# Patient Record
Sex: MALE | Race: ASIAN | NOT HISPANIC OR LATINO
[De-identification: names, ages, dates, MRNs, and addresses within clinical notes are randomized per-mention and may not be internally consistent; named-entity substitution may affect disease eponyms.]

---

## 2019-05-09 ENCOUNTER — APPOINTMENT (OUTPATIENT)
Dept: OPHTHALMOLOGY | Facility: CLINIC | Age: 69
End: 2019-05-09

## 2020-11-20 ENCOUNTER — APPOINTMENT (OUTPATIENT)
Dept: CARDIOLOGY | Facility: CLINIC | Age: 70
End: 2020-11-20
Payer: MEDICARE

## 2020-11-20 ENCOUNTER — NON-APPOINTMENT (OUTPATIENT)
Age: 70
End: 2020-11-20

## 2020-11-20 VITALS
DIASTOLIC BLOOD PRESSURE: 83 MMHG | TEMPERATURE: 98.4 F | WEIGHT: 160 LBS | OXYGEN SATURATION: 95 % | HEART RATE: 72 BPM | BODY MASS INDEX: 25.71 KG/M2 | HEIGHT: 66 IN | RESPIRATION RATE: 18 BRPM | SYSTOLIC BLOOD PRESSURE: 161 MMHG

## 2020-11-20 DIAGNOSIS — Z82.49 FAMILY HISTORY OF ISCHEMIC HEART DISEASE AND OTHER DISEASES OF THE CIRCULATORY SYSTEM: ICD-10-CM

## 2020-11-20 DIAGNOSIS — E78.5 HYPERLIPIDEMIA, UNSPECIFIED: ICD-10-CM

## 2020-11-20 DIAGNOSIS — R00.2 PALPITATIONS: ICD-10-CM

## 2020-11-20 PROCEDURE — 93000 ELECTROCARDIOGRAM COMPLETE: CPT

## 2020-11-20 PROCEDURE — 99204 OFFICE O/P NEW MOD 45 MIN: CPT

## 2020-11-25 PROBLEM — E78.5 HLD (HYPERLIPIDEMIA): Status: ACTIVE | Noted: 2020-11-25

## 2020-11-25 RX ORDER — ATORVASTATIN CALCIUM 20 MG/1
20 TABLET, FILM COATED ORAL
Refills: 0 | Status: ACTIVE | COMMUNITY

## 2020-11-25 NOTE — PHYSICAL EXAM
[General Appearance - Well Developed] : well developed [Normal Appearance] : normal appearance [Well Groomed] : well groomed [General Appearance - Well Nourished] : well nourished [No Deformities] : no deformities [General Appearance - In No Acute Distress] : no acute distress [Normal Conjunctiva] : the conjunctiva exhibited no abnormalities [Eyelids - No Xanthelasma] : the eyelids demonstrated no xanthelasmas [Normal Oral Mucosa] : normal oral mucosa [No Oral Pallor] : no oral pallor [No Oral Cyanosis] : no oral cyanosis [Normal Jugular Venous A Waves Present] : normal jugular venous A waves present [Normal Jugular Venous V Waves Present] : normal jugular venous V waves present [No Jugular Venous Parekh A Waves] : no jugular venous parekh A waves [Heart Rate And Rhythm] : heart rate and rhythm were normal [Heart Sounds] : normal S1 and S2 [Murmurs] : no murmurs present [Arterial Pulses Normal] : the arterial pulses were normal [Edema] : no peripheral edema present [Respiration, Rhythm And Depth] : normal respiratory rhythm and effort [Exaggerated Use Of Accessory Muscles For Inspiration] : no accessory muscle use [Auscultation Breath Sounds / Voice Sounds] : lungs were clear to auscultation bilaterally [Abdomen Soft] : soft [Abdomen Tenderness] : non-tender [Abdomen Mass (___ Cm)] : no abdominal mass palpated [Abnormal Walk] : normal gait [Gait - Sufficient For Exercise Testing] : the gait was sufficient for exercise testing [Nail Clubbing] : no clubbing of the fingernails [Cyanosis, Localized] : no localized cyanosis [Petechial Hemorrhages (___cm)] : no petechial hemorrhages [] : no ischemic changes [Oriented To Time, Place, And Person] : oriented to person, place, and time [Affect] : the affect was normal [Mood] : the mood was normal [No Anxiety] : not feeling anxious

## 2020-11-25 NOTE — REASON FOR VISIT
[FreeTextEntry1] : 70 year old male with HTN and colon CA s/p surgery presents for evaluation of uncontrolled BP.  Pt has never had cardiology evaluation.  His blood pressure at home usually ranges 160-170/. He had colonoscopy done 2 days ago.  He was told by GI doctor, Beny Bhatia, that his blood pressure was high. He was in [facility] for chest pain 2 years ago. The result was negative for CAD.  He was also in [facility] for unbalance gait.  His CT head was negative.  He was diagnosed with vertigo.  He reports palpitations when he is nervous.  Pt denies CP/SOB.  He denies hx of syncope.

## 2020-11-25 NOTE — DISCUSSION/SUMMARY
[FreeTextEntry1] : 70 year old male with HTN and colon CA s/p surgery presents for evaluation of uncontrolled BP.  Pt has never had cardiology evaluation.  His blood pressure at home usually ranges 160-170/. He had colonoscopy done 2 days ago.  He was told by GI doctor, Beny Bhatia, that his blood pressure was high. He was in [facility] for chest pain 2 years ago. The result was negative for CAD.  He was also in [facility] for unbalance gait.  His CT head was negative.  He was diagnosed with vertigo.  He reports palpitations when he is nervous.  Pt denies CP/SOB.  He denies hx of syncope.\par \par I advised patient to undergo an echocardiogram.  I will obtain insurance authorization (palpitations, abnormal ECG)

## 2020-12-16 ENCOUNTER — APPOINTMENT (OUTPATIENT)
Dept: CARDIOLOGY | Facility: CLINIC | Age: 70
End: 2020-12-16

## 2021-01-13 NOTE — PHYSICAL EXAM
[General Appearance - Well Developed] : well developed [Normal Appearance] : normal appearance [Well Groomed] : well groomed [General Appearance - Well Nourished] : well nourished [No Deformities] : no deformities [General Appearance - In No Acute Distress] : no acute distress [Normal Conjunctiva] : the conjunctiva exhibited no abnormalities [Eyelids - No Xanthelasma] : the eyelids demonstrated no xanthelasmas [Normal Oral Mucosa] : normal oral mucosa [No Oral Pallor] : no oral pallor [No Oral Cyanosis] : no oral cyanosis [Normal Jugular Venous A Waves Present] : normal jugular venous A waves present [Normal Jugular Venous V Waves Present] : normal jugular venous V waves present [No Jugular Venous Parekh A Waves] : no jugular venous parekh A waves [Respiration, Rhythm And Depth] : normal respiratory rhythm and effort [Exaggerated Use Of Accessory Muscles For Inspiration] : no accessory muscle use [Auscultation Breath Sounds / Voice Sounds] : lungs were clear to auscultation bilaterally [Heart Rate And Rhythm] : heart rate and rhythm were normal [Heart Sounds] : normal S1 and S2 [Murmurs] : no murmurs present [Arterial Pulses Normal] : the arterial pulses were normal [Edema] : no peripheral edema present [Abdomen Soft] : soft [Abdomen Tenderness] : non-tender [Abdomen Mass (___ Cm)] : no abdominal mass palpated [Abnormal Walk] : normal gait [Gait - Sufficient For Exercise Testing] : the gait was sufficient for exercise testing [Nail Clubbing] : no clubbing of the fingernails [Cyanosis, Localized] : no localized cyanosis [Petechial Hemorrhages (___cm)] : no petechial hemorrhages [] : no ischemic changes [Oriented To Time, Place, And Person] : oriented to person, place, and time [Affect] : the affect was normal [Mood] : the mood was normal [No Anxiety] : not feeling anxious

## 2021-01-15 ENCOUNTER — APPOINTMENT (OUTPATIENT)
Dept: CARDIOLOGY | Facility: CLINIC | Age: 71
End: 2021-01-15
Payer: MEDICARE

## 2021-01-15 VITALS
TEMPERATURE: 97.2 F | DIASTOLIC BLOOD PRESSURE: 91 MMHG | HEART RATE: 55 BPM | BODY MASS INDEX: 25.82 KG/M2 | RESPIRATION RATE: 18 BRPM | OXYGEN SATURATION: 96 % | SYSTOLIC BLOOD PRESSURE: 180 MMHG | WEIGHT: 160 LBS

## 2021-01-15 PROCEDURE — 93306 TTE W/DOPPLER COMPLETE: CPT

## 2021-01-15 PROCEDURE — 99072 ADDL SUPL MATRL&STAF TM PHE: CPT

## 2021-01-15 PROCEDURE — 99213 OFFICE O/P EST LOW 20 MIN: CPT

## 2021-01-15 RX ORDER — AMLODIPINE BESYLATE 10 MG/1
10 TABLET ORAL DAILY
Qty: 30 | Refills: 5 | Status: ACTIVE | COMMUNITY
Start: 1900-01-01 | End: 1900-01-01

## 2021-01-26 NOTE — HISTORY OF PRESENT ILLNESS
[FreeTextEntry1] : 70 year old male with HTN and colon CA s/p surgery presents for followup.  \par \par Patient was last seen on 11/20/20 for evaluation of uncontrolled BP.  I advised patient to undergo an echocardiogram but he did not show for test.  I advised patient to start HCTZ 12.5 mg.  I advised patient to continue Amlodipine 5 mg Atenolol 100 mg and Enalapril 20 mg.

## 2021-01-26 NOTE — REASON FOR VISIT
[Follow-Up - Clinic] : a clinic follow-up of [FreeTextEntry1] : 1/15/21- Patient denies CP, SOB, palpitations, or lightheadedness.  Patient did not return for echo last time and will get it today. His BP elevated, but on second take it was 140/90. I advised patient to increase dose of Amlodipine to 10 mg and to check his BP at home. \par \par \par 11/20/20 -  Pt has never had cardiology evaluation.  His blood pressure at home usually ranges 160-170/. He had colonoscopy done 2 days ago.  He was told by GI doctor, Beny Bhatia, that his blood pressure was high. He was in [facility] for chest pain 2 years ago. The result was negative for CAD.  He was also in [facility] for unbalance gait.  His CT head was negative.  He was diagnosed with vertigo.  He reports palpitations when he is nervous.  Pt denies CP/SOB.  He denies hx of syncope.

## 2021-01-26 NOTE — DISCUSSION/SUMMARY
[FreeTextEntry1] : 70 year old male with HTN and colon CA s/p surgery presents for evaluation of uncontrolled BP.  Pt has never had cardiology evaluation.  His blood pressure at home usually ranges 160-170/. He had colonoscopy done 2 days ago.  He was told by GI doctor, Beny Bhatia, that his blood pressure was high. He was in [facility] for chest pain 2 years ago. The result was negative for CAD.  He was also in [facility] for unbalance gait.  His CT head was negative.  He was diagnosed with vertigo.  He reports palpitations when he is nervous.  Pt denies CP/SOB.  He denies hx of syncope.\par \par I advised patient to undergo an echocardiogram.  I will obtain insurance authorization (palpitations, abnormal ECG)

## 2021-09-14 ENCOUNTER — NON-APPOINTMENT (OUTPATIENT)
Age: 71
End: 2021-09-14

## 2021-09-14 ENCOUNTER — APPOINTMENT (OUTPATIENT)
Dept: CARDIOLOGY | Facility: CLINIC | Age: 71
End: 2021-09-14
Payer: MEDICARE

## 2021-09-14 VITALS
SYSTOLIC BLOOD PRESSURE: 147 MMHG | BODY MASS INDEX: 25.99 KG/M2 | OXYGEN SATURATION: 99 % | HEART RATE: 56 BPM | WEIGHT: 161 LBS | TEMPERATURE: 97.6 F | DIASTOLIC BLOOD PRESSURE: 81 MMHG | RESPIRATION RATE: 18 BRPM

## 2021-09-14 PROCEDURE — 99214 OFFICE O/P EST MOD 30 MIN: CPT

## 2021-09-14 PROCEDURE — 93000 ELECTROCARDIOGRAM COMPLETE: CPT

## 2021-09-14 RX ORDER — ENALAPRIL MALEATE 20 MG/1
20 TABLET ORAL TWICE DAILY
Qty: 60 | Refills: 5 | Status: ACTIVE | COMMUNITY
Start: 1900-01-01 | End: 1900-01-01

## 2021-09-14 RX ORDER — ATENOLOL 50 MG/1
50 TABLET ORAL
Qty: 30 | Refills: 5 | Status: ACTIVE | COMMUNITY
Start: 1900-01-01 | End: 1900-01-01

## 2021-09-14 RX ORDER — HYDROCHLOROTHIAZIDE 12.5 MG/1
12.5 TABLET ORAL
Qty: 30 | Refills: 5 | Status: ACTIVE | COMMUNITY
Start: 2020-11-20 | End: 1900-01-01

## 2021-09-14 NOTE — PHYSICAL EXAM
[General Appearance - Well Developed] : well developed [Normal Appearance] : normal appearance [Well Groomed] : well groomed [General Appearance - Well Nourished] : well nourished [No Deformities] : no deformities [General Appearance - In No Acute Distress] : no acute distress [Eyelids - No Xanthelasma] : the eyelids demonstrated no xanthelasmas [Normal Conjunctiva] : the conjunctiva exhibited no abnormalities [Normal Oral Mucosa] : normal oral mucosa [No Oral Pallor] : no oral pallor [No Oral Cyanosis] : no oral cyanosis [Normal Jugular Venous A Waves Present] : normal jugular venous A waves present [Normal Jugular Venous V Waves Present] : normal jugular venous V waves present [No Jugular Venous Parekh A Waves] : no jugular venous parekh A waves [Respiration, Rhythm And Depth] : normal respiratory rhythm and effort [Exaggerated Use Of Accessory Muscles For Inspiration] : no accessory muscle use [Auscultation Breath Sounds / Voice Sounds] : lungs were clear to auscultation bilaterally [Heart Rate And Rhythm] : heart rate and rhythm were normal [Heart Sounds] : normal S1 and S2 [Murmurs] : no murmurs present [Arterial Pulses Normal] : the arterial pulses were normal [Edema] : no peripheral edema present [Abdomen Soft] : soft [Abdomen Tenderness] : non-tender [Abdomen Mass (___ Cm)] : no abdominal mass palpated [Abnormal Walk] : normal gait [Gait - Sufficient For Exercise Testing] : the gait was sufficient for exercise testing [Nail Clubbing] : no clubbing of the fingernails [Cyanosis, Localized] : no localized cyanosis [Petechial Hemorrhages (___cm)] : no petechial hemorrhages [] : no ischemic changes [Oriented To Time, Place, And Person] : oriented to person, place, and time [Affect] : the affect was normal [Mood] : the mood was normal [No Anxiety] : not feeling anxious

## 2021-09-17 NOTE — REASON FOR VISIT
[Symptom and Test Evaluation] : symptom and test evaluation [Follow-Up - Clinic] : a clinic follow-up of [FreeTextEntry1] : 9/14/21 - Patient went to [facility] due to dizziness. Patient says he wanted to see a neurologist but he was told to see cardiologist because his HR is slow. Patient reports that his dizziness lasted 30 seconds and it comes and goes. Patient is on Atenolol 100 mg and HCTZ 12.5 mg, Amlodipine 10 mg, Enalapril 20 mg. Patient feels fine today. He goes to gym daily and can bench press daily without symptoms. He reports that dizziness is better today. Patient denies CP, SOB, palpitations, or lightheadedness. ECG is done for bradycardia. Patient's BP is elevated at 140.  I advised patient to reduce dose of Atenolol to 50 mg and increase dose of Enalapril 20 mg to BID. FU in one month. \par \par \par 1/15/21- Patient denies CP, SOB, palpitations, or lightheadedness.  Patient did not return for echo last time and will get it today. His BP elevated, but on second take it was 140/90. I advised patient to increase dose of Amlodipine to 10 mg and to check his BP at home. \par \par 11/20/20 -  Pt has never had cardiology evaluation.  His blood pressure at home usually ranges 160-170/. He had colonoscopy done 2 days ago.  He was told by GI doctor, Beny Bhatia, that his blood pressure was high. He was in [facility] for chest pain 2 years ago. The result was negative for CAD.  He was also in [facility] for unbalance gait.  His CT head was negative.  He was diagnosed with vertigo.  He reports palpitations when he is nervous.  Pt denies CP/SOB.  He denies hx of syncope.

## 2021-09-17 NOTE — HISTORY OF PRESENT ILLNESS
[FreeTextEntry1] : 71 year old male with HTN and colon CA s/p surgery presents for followup.  \par \par Patient was last seen on 1/15/21.  Patient underwent an echocardiogram and it showed normal LV function, mild dilatation of the ascending aorta (3.9 cm), without significant valvular pathology.  I advised patient to increase Amlodipine to 10 mg.  \par \par He is on  Amlodipine 10 mg, Atenolol 100 mg and Enalapril 20 mg.

## 2021-09-29 ENCOUNTER — APPOINTMENT (OUTPATIENT)
Dept: CARDIOLOGY | Facility: CLINIC | Age: 71
End: 2021-09-29
Payer: MEDICARE

## 2021-09-29 VITALS
BODY MASS INDEX: 25.99 KG/M2 | WEIGHT: 161 LBS | SYSTOLIC BLOOD PRESSURE: 156 MMHG | OXYGEN SATURATION: 98 % | RESPIRATION RATE: 17 BRPM | TEMPERATURE: 98 F | HEART RATE: 53 BPM | DIASTOLIC BLOOD PRESSURE: 79 MMHG

## 2021-09-29 DIAGNOSIS — I10 ESSENTIAL (PRIMARY) HYPERTENSION: ICD-10-CM

## 2021-09-29 DIAGNOSIS — R42 DIZZINESS AND GIDDINESS: ICD-10-CM

## 2021-09-29 PROCEDURE — 99213 OFFICE O/P EST LOW 20 MIN: CPT

## 2021-09-29 NOTE — REASON FOR VISIT
[Symptom and Test Evaluation] : symptom and test evaluation [Follow-Up - Clinic] : a clinic follow-up of [FreeTextEntry1] : 9/14/21 - Patient went to [facility] due to dizziness. Patient says he wanted to see a neurologist but he was told to see cardiologist because his HR is slow. Patient reports that his dizziness lasted 30 seconds and it comes and goes. Patient is on Atenolol 100 mg and HCTZ 12.5 mg, Amlodipine 10 mg, Enalapril 20 mg. Patient feels fine today. He goes to gym daily and can bench press daily without symptoms. He reports that dizziness is better today. Patient denies CP, SOB, palpitations, or lightheadedness. ECG is done for bradycardia. Patient's BP is elevated at 140.  I advised patient to reduce dose of Atenolol to 50 mg and increase dose of Enalapril 20 mg to BID. FU in one month. \par \par 1/15/21- Patient denies CP, SOB, palpitations, or lightheadedness.  Patient did not return for echo last time and will get it today. His BP elevated, but on second take it was 140/90. I advised patient to increase dose of Amlodipine to 10 mg and to check his BP at home. \par \par 11/20/20 -  Pt has never had cardiology evaluation.  His blood pressure at home usually ranges 160-170/. He had colonoscopy done 2 days ago.  He was told by GI doctor, Beny Bhatia, that his blood pressure was high. He was in [facility] for chest pain 2 years ago. The result was negative for CAD.  He was also in [facility] for unbalance gait.  His CT head was negative.  He was diagnosed with vertigo.  He reports palpitations when he is nervous.  Pt denies CP/SOB.  He denies hx of syncope.

## 2021-09-29 NOTE — HISTORY OF PRESENT ILLNESS
[FreeTextEntry1] : 71 year old male with HTN and colon CA s/p surgery presents for followup.  \par \par Patient was last seen on 9/14/21 for low HR and elevated BP.   I advised patient to reduce dose of Atenolol to 50 mg and increase dose of Enalapril 20 mg to BID.\par \par He is on  Amlodipine 10 mg, Atenolol 50 mg, Enalapril 20 mg BID, HCTZ 12.5 mg for HTN.\par \par   Patient underwent an echocardiogram and it showed normal LV function, mild dilatation of the ascending aorta (3.9 cm), without significant valvular pathology.\par \par

## 2021-09-29 NOTE — PHYSICAL EXAM
[General Appearance - Well Developed] : well developed [Normal Appearance] : normal appearance [Well Groomed] : well groomed [General Appearance - Well Nourished] : well nourished [No Deformities] : no deformities [General Appearance - In No Acute Distress] : no acute distress [Normal Conjunctiva] : the conjunctiva exhibited no abnormalities [Eyelids - No Xanthelasma] : the eyelids demonstrated no xanthelasmas [Normal Oral Mucosa] : normal oral mucosa [No Oral Pallor] : no oral pallor [No Oral Cyanosis] : no oral cyanosis [Normal Jugular Venous A Waves Present] : normal jugular venous A waves present [Normal Jugular Venous V Waves Present] : normal jugular venous V waves present [No Jugular Venous Parekh A Waves] : no jugular venous parekh A waves [Respiration, Rhythm And Depth] : normal respiratory rhythm and effort [Exaggerated Use Of Accessory Muscles For Inspiration] : no accessory muscle use [Auscultation Breath Sounds / Voice Sounds] : lungs were clear to auscultation bilaterally [Heart Rate And Rhythm] : heart rate and rhythm were normal [Heart Sounds] : normal S1 and S2 [Murmurs] : no murmurs present [Arterial Pulses Normal] : the arterial pulses were normal [Edema] : no peripheral edema present [Abdomen Soft] : soft [Abdomen Tenderness] : non-tender [Abdomen Mass (___ Cm)] : no abdominal mass palpated [Abnormal Walk] : normal gait [Gait - Sufficient For Exercise Testing] : the gait was sufficient for exercise testing [Nail Clubbing] : no clubbing of the fingernails [Cyanosis, Localized] : no localized cyanosis [Petechial Hemorrhages (___cm)] : no petechial hemorrhages [] : no ischemic changes [Oriented To Time, Place, And Person] : oriented to person, place, and time [Mood] : the mood was normal [Affect] : the affect was normal [No Anxiety] : not feeling anxious

## 2021-09-30 LAB
ALBUMIN SERPL ELPH-MCNC: 4.4 G/DL
ALP BLD-CCNC: 92 U/L
ALT SERPL-CCNC: 12 U/L
ANION GAP SERPL CALC-SCNC: 12 MMOL/L
AST SERPL-CCNC: 21 U/L
BASOPHILS # BLD AUTO: 0.05 K/UL
BASOPHILS NFR BLD AUTO: 0.6 %
BILIRUB SERPL-MCNC: 0.4 MG/DL
BUN SERPL-MCNC: 16 MG/DL
CALCIUM SERPL-MCNC: 9.8 MG/DL
CHLORIDE SERPL-SCNC: 99 MMOL/L
CO2 SERPL-SCNC: 24 MMOL/L
CREAT SERPL-MCNC: 1.1 MG/DL
EOSINOPHIL # BLD AUTO: 0.64 K/UL
EOSINOPHIL NFR BLD AUTO: 7.9 %
GLUCOSE SERPL-MCNC: 106 MG/DL
HCT VFR BLD CALC: 42.1 %
HGB BLD-MCNC: 14 G/DL
IMM GRANULOCYTES NFR BLD AUTO: 0.4 %
LYMPHOCYTES # BLD AUTO: 1.41 K/UL
LYMPHOCYTES NFR BLD AUTO: 17.5 %
MAN DIFF?: NORMAL
MCHC RBC-ENTMCNC: 30.4 PG
MCHC RBC-ENTMCNC: 33.3 GM/DL
MCV RBC AUTO: 91.3 FL
MONOCYTES # BLD AUTO: 0.95 K/UL
MONOCYTES NFR BLD AUTO: 11.8 %
NEUTROPHILS # BLD AUTO: 4.98 K/UL
NEUTROPHILS NFR BLD AUTO: 61.8 %
PLATELET # BLD AUTO: 239 K/UL
POTASSIUM SERPL-SCNC: 4.6 MMOL/L
PROT SERPL-MCNC: 7.1 G/DL
RBC # BLD: 4.61 M/UL
RBC # FLD: 12.8 %
SODIUM SERPL-SCNC: 134 MMOL/L
WBC # FLD AUTO: 8.06 K/UL

## 2021-10-05 ENCOUNTER — NON-APPOINTMENT (OUTPATIENT)
Age: 71
End: 2021-10-05

## 2021-10-07 ENCOUNTER — NON-APPOINTMENT (OUTPATIENT)
Age: 71
End: 2021-10-07